# Patient Record
Sex: MALE | Race: BLACK OR AFRICAN AMERICAN
[De-identification: names, ages, dates, MRNs, and addresses within clinical notes are randomized per-mention and may not be internally consistent; named-entity substitution may affect disease eponyms.]

---

## 2024-02-28 ENCOUNTER — NON-APPOINTMENT (OUTPATIENT)
Age: 49
End: 2024-02-28

## 2024-02-28 ENCOUNTER — APPOINTMENT (OUTPATIENT)
Dept: ORTHOPEDIC SURGERY | Facility: CLINIC | Age: 49
End: 2024-02-28
Payer: OTHER MISCELLANEOUS

## 2024-02-28 DIAGNOSIS — S16.1XXA STRAIN OF MUSCLE, FASCIA AND TENDON AT NECK LEVEL, INITIAL ENCOUNTER: ICD-10-CM

## 2024-02-28 PROBLEM — Z00.00 ENCOUNTER FOR PREVENTIVE HEALTH EXAMINATION: Status: ACTIVE | Noted: 2024-02-28

## 2024-02-28 PROCEDURE — 99204 OFFICE O/P NEW MOD 45 MIN: CPT

## 2024-02-28 RX ORDER — DICLOFENAC SODIUM 75 MG/1
75 TABLET, DELAYED RELEASE ORAL TWICE DAILY
Qty: 60 | Refills: 0 | Status: COMPLETED | COMMUNITY
Start: 2024-02-28 | End: 2024-03-29

## 2024-02-28 NOTE — IMAGING
[Right] : right shoulder [Outside films reviewed] : Outside films reviewed [There are no fractures, subluxations or dislocations. No significant abnormalities are seen] : There are no fractures, subluxations or dislocations. No significant abnormalities are seen [FreeTextEntry1] : no acute fx

## 2024-02-28 NOTE — WORK
[Sprain/Strain] : sprain/strain [Are consistent with the injury] : are consistent with the injury [Was the competent medical cause of the injury] : was the competent medical cause of the injury [Consistent with my objective findings] : consistent with my objective findings [Mild Partial] : mild partial [Does not reveal pre-existing condition(s) that may affect treatment/prognosis] : does not reveal pre-existing condition(s) that may affect treatment/prognosis [Can return to work with limitations on ______] : can return to work with limitations on [unfilled] [Lifting] : lifting [Reaching at/below shoulder level] : reaching at or below shoulder level [Pulling/Pushing] : pulling/pushing [Reaching overhead] : reaching overhead [Use of upper extremities] : use of upper extremities [Unknown at this time] : : unknown at this time [I provided the services listed above] :  I provided the services listed above. [Patient] : patient [No Rx restrictions] : No Rx restrictions.

## 2024-02-28 NOTE — ASSESSMENT
[FreeTextEntry1] : R shoulder with impingement/ bursitis.  Also right trap/ neck pain with spasms MRI right shoulder to assess to eval cuff. Start pt for neck and right shoulder.  Diclofenac prn.  Okay to continue to rtw light duty, 20 lbs WB restriction.  RTO with results.    ***I do reocmmend neck be included on the case, as I think there is an injury to the neck

## 2024-02-28 NOTE — HISTORY OF PRESENT ILLNESS
[Right Arm] : right arm [Work related] : work related [Sudden] : sudden [Dull/Aching] : dull/aching [Sharp] : sharp [Frequent] : frequent [Stabbing] : stabbing [Leisure] : leisure [Exercising] : exercising [Rest] : rest [Full time] : Work status: full time [de-identified] : WC DOI 1/23/24 ([facility] )  2/28/24:  This is 49 yo RHD male who presents with acute onset of right shoulder pain after a fall on the truck at work on date above.  When he fell he braced himself on the floor with his forearm.  No prior right shoulder issues.  Pain continues to persist and worse heavy lifting and reaching.  He also gets occ pain into the neck as well.  Some tingling into right arm at times.  [facility]  He then went to PCP who recommended PT which he did not do.   Currently working.    [] : Post Surgical Visit: no [FreeTextEntry1] : shoulder [FreeTextEntry3] : 1/23/24 [FreeTextEntry5] : pt fell while on the truck and caused pain in the right shoulder.  [de-identified] : PCP [de-identified] : xrays

## 2024-03-02 ENCOUNTER — APPOINTMENT (OUTPATIENT)
Dept: MRI IMAGING | Facility: CLINIC | Age: 49
End: 2024-03-02
Payer: OTHER MISCELLANEOUS

## 2024-03-02 PROCEDURE — 73221 MRI JOINT UPR EXTREM W/O DYE: CPT | Mod: RT

## 2024-03-13 ENCOUNTER — APPOINTMENT (OUTPATIENT)
Dept: ORTHOPEDIC SURGERY | Facility: CLINIC | Age: 49
End: 2024-03-13
Payer: OTHER MISCELLANEOUS

## 2024-03-13 ENCOUNTER — NON-APPOINTMENT (OUTPATIENT)
Age: 49
End: 2024-03-13

## 2024-03-13 VITALS — HEIGHT: 68 IN | WEIGHT: 210 LBS | BODY MASS INDEX: 31.83 KG/M2

## 2024-03-13 PROCEDURE — 99214 OFFICE O/P EST MOD 30 MIN: CPT

## 2024-03-13 NOTE — WORK
[Sprain/Strain] : sprain/strain [Was the competent medical cause of the injury] : was the competent medical cause of the injury [Are consistent with the injury] : are consistent with the injury [Consistent with my objective findings] : consistent with my objective findings [Mild Partial] : mild partial [Does not reveal pre-existing condition(s) that may affect treatment/prognosis] : does not reveal pre-existing condition(s) that may affect treatment/prognosis [Can return to work with limitations on ______] : can return to work with limitations on [unfilled] [Lifting] : lifting [Pulling/Pushing] : pulling/pushing [Reaching overhead] : reaching overhead [Reaching at/below shoulder level] : reaching at or below shoulder level [Use of upper extremities] : use of upper extremities [Unknown at this time] : : unknown at this time [Patient] : patient [No Rx restrictions] : No Rx restrictions. [I provided the services listed above] :  I provided the services listed above.

## 2024-03-13 NOTE — ASSESSMENT
[FreeTextEntry1] : R shoulder with impingement/ bursitis.  Also right trap/ neck pain with spasms MRI R shoulder: PRCT, bursitis, signs of shoulder subluxation with labral tearing with Henriquez Sachs.  Start PT for neck and right shoulder.  Diclofenac prn.  RTW light duty, 20 lbs WB restriction.  RTO with results.    ***I do recommend neck be included on the case, as I think there is an injury to the neck

## 2024-03-13 NOTE — DATA REVIEWED
[MRI] : MRI [Right] : of the right [Shoulder] : shoulder [I independently reviewed and interpreted images and report] : I independently reviewed and interpreted images and report [FreeTextEntry1] : PRCT, bursitis, signs of shoulder subluxation with labral tearing with Henriquez Sachs.

## 2024-03-13 NOTE — HISTORY OF PRESENT ILLNESS
[6] : 6 [4] : 4 [Dull/Aching] : dull/aching [Light duty] : Work status: light duty [de-identified] : WC DOI 1/23/24 ([facility] )  3/13/24: Here for MRI review. He continues to feel some pain in the shoulder and paresthesias in the digits.   MRI R shoulder: PRCT, bursitis, signs of shoulder subluxation with labral tearing with Henriquez Sachs.   2/28/24:  This is 49 yo RHD male who presents with acute onset of right shoulder pain after a fall on the truck at work on date above.  When he fell he braced himself on the floor with his forearm.  No prior right shoulder issues.  Pain continues to persist and worse heavy lifting and reaching.  He also gets occ pain into the neck as well.  Some tingling into right arm at times.  [facility].  He then went to PCP who recommended PT which he did not do.   Currently working.    [] : Post Surgical Visit: no [de-identified] : none

## 2024-04-24 ENCOUNTER — NON-APPOINTMENT (OUTPATIENT)
Age: 49
End: 2024-04-24

## 2024-04-24 ENCOUNTER — APPOINTMENT (OUTPATIENT)
Dept: ORTHOPEDIC SURGERY | Facility: CLINIC | Age: 49
End: 2024-04-24
Payer: OTHER MISCELLANEOUS

## 2024-04-24 VITALS — WEIGHT: 210 LBS | HEIGHT: 68 IN | BODY MASS INDEX: 31.83 KG/M2

## 2024-04-24 PROCEDURE — 99213 OFFICE O/P EST LOW 20 MIN: CPT

## 2024-04-24 NOTE — ASSESSMENT
[FreeTextEntry1] : R shoulder with impingement/ bursitis.  Also right trap/ neck pain with spasms MRI R shoulder: PRCT, bursitis, signs of shoulder subluxation with labral tearing with Henriquez Sachs.  Continue PT for neck and right shoulder as he is making gains.  Working light duty, 20 lbs WB restriction.  RTO 6 weeks   ***I do recommend neck be included on the case, as I think there is an injury to the neck

## 2024-04-24 NOTE — HISTORY OF PRESENT ILLNESS
[Work related] : work related [5] : 5 [4] : 4 [Dull/Aching] : dull/aching [Sharp] : sharp [Full time] : Work status: full time [de-identified] : WC DOI 1/23/24 ([facility] )  4/24/24: Here for follow-up on the right shoulder. He has been attending PT with improvement.  Some tingling persists into the arm. He is working with restrictions.   3/13/24: Here for MRI review. He continues to feel some pain in the shoulder and paresthesias in the digits.   MRI R shoulder: PRCT, bursitis, signs of shoulder subluxation with labral tearing with Henriquez Sachs.   2/28/24:  This is 47 yo RHD male who presents with acute onset of right shoulder pain after a fall on the truck at work on date above.  When he fell he braced himself on the floor with his forearm.  No prior right shoulder issues.  Pain continues to persist and worse heavy lifting and reaching.  He also gets occ pain into the neck as well.  Some tingling into right arm at times.  [facility].  He then went to PCP who recommended PT which he did not do.   Currently working.    [] : Post Surgical Visit: no [FreeTextEntry1] : Right shoulder  [FreeTextEntry3] : 1/23/2024 [de-identified] : pt

## 2024-04-24 NOTE — PHYSICAL EXAM
[Right] : right shoulder [] : no erythema [4 ___] : forward flexion 4[unfilled]/5 [4___] : external rotation 4[unfilled]/5 [FreeTextEntry9] : FF - 165 ER - 60

## 2024-06-05 ENCOUNTER — NON-APPOINTMENT (OUTPATIENT)
Age: 49
End: 2024-06-05

## 2024-06-05 ENCOUNTER — APPOINTMENT (OUTPATIENT)
Dept: ORTHOPEDIC SURGERY | Facility: CLINIC | Age: 49
End: 2024-06-05
Payer: OTHER MISCELLANEOUS

## 2024-06-05 VITALS — WEIGHT: 210 LBS | BODY MASS INDEX: 31.83 KG/M2 | HEIGHT: 68 IN

## 2024-06-05 DIAGNOSIS — M75.111 INCOMPLETE ROTATOR CUFF TEAR OR RUPTURE OF RIGHT SHOULDER, NOT SPECIFIED AS TRAUMATIC: ICD-10-CM

## 2024-06-05 DIAGNOSIS — M75.51 BURSITIS OF RIGHT SHOULDER: ICD-10-CM

## 2024-06-05 DIAGNOSIS — M75.41 IMPINGEMENT SYNDROME OF RIGHT SHOULDER: ICD-10-CM

## 2024-06-05 DIAGNOSIS — S43.001D UNSPECIFIED SUBLUXATION OF RIGHT SHOULDER JOINT, SUBSEQUENT ENCOUNTER: ICD-10-CM

## 2024-06-05 DIAGNOSIS — S43.431D SUPERIOR GLENOID LABRUM LESION OF RIGHT SHOULDER, SUBSEQUENT ENCOUNTER: ICD-10-CM

## 2024-06-05 PROCEDURE — 99213 OFFICE O/P EST LOW 20 MIN: CPT

## 2024-06-05 NOTE — HISTORY OF PRESENT ILLNESS
[Part time] : Work status: part time [de-identified] : WC DOI 1/23/24 ([facility] )  6/5/24:   Here for follow up.  He is in PT with improvement.  He feels motion has improved, but still has some weakness and pain.  He continues to have pain in the neck and is starting to have pain in the left shoulder as well, due to compensation.   4/24/24: Here for follow-up on the right shoulder. He has been attending PT with improvement.  Some tingling persists into the arm. He is working with restrictions.   3/13/24: Here for MRI review. He continues to feel some pain in the shoulder and paresthesias in the digits.   MRI R shoulder: PRCT, bursitis, signs of shoulder subluxation with labral tearing with Martinez Jameson.   2/28/24:  This is 47 yo RHD male who presents with acute onset of right shoulder pain after a fall on the truck at work on date above.  When he fell he braced himself on the floor with his forearm.  No prior right shoulder issues.  Pain continues to persist and worse heavy lifting and reaching.  He also gets occ pain into the neck as well.  Some tingling into right arm at times.  [facility].  He then went to PCP who recommended PT which he did not do.   Currently working.    [] : Post Surgical Visit: no

## 2024-06-05 NOTE — ASSESSMENT
[FreeTextEntry1] : R shoulder with impingement/ bursitis.  Also right trap/ neck pain with spasms MRI R shoulder: PRCT, bursitis, signs of shoulder subluxation with labral tearing with Henriquez Sachs.  Continue PT for neck and right shoulder as he is making gains.  Working light duty, 20 lbs WB restriction.  RTO 6-8 weeks   ***I do recommend neck be included on the case, as I think there is an injury to the neck

## 2024-06-05 NOTE — PHYSICAL EXAM
[Right] : right shoulder [5 ___] : forward flexion 5[unfilled]/5 [5___] : external rotation 5[unfilled]/5 [] : discomfort with strength testing [FreeTextEntry9] : FF - 160 ER - 60

## 2024-07-31 ENCOUNTER — APPOINTMENT (OUTPATIENT)
Dept: ORTHOPEDIC SURGERY | Facility: CLINIC | Age: 49
End: 2024-07-31
Payer: OTHER MISCELLANEOUS

## 2024-07-31 VITALS — BODY MASS INDEX: 31.83 KG/M2 | WEIGHT: 210 LBS | HEIGHT: 68 IN

## 2024-07-31 DIAGNOSIS — M75.41 IMPINGEMENT SYNDROME OF RIGHT SHOULDER: ICD-10-CM

## 2024-07-31 DIAGNOSIS — M75.111 INCOMPLETE ROTATOR CUFF TEAR OR RUPTURE OF RIGHT SHOULDER, NOT SPECIFIED AS TRAUMATIC: ICD-10-CM

## 2024-07-31 DIAGNOSIS — M75.51 BURSITIS OF RIGHT SHOULDER: ICD-10-CM

## 2024-07-31 DIAGNOSIS — S43.431D SUPERIOR GLENOID LABRUM LESION OF RIGHT SHOULDER, SUBSEQUENT ENCOUNTER: ICD-10-CM

## 2024-07-31 PROCEDURE — 99213 OFFICE O/P EST LOW 20 MIN: CPT

## 2024-07-31 RX ORDER — DICLOFENAC SODIUM 75 MG/1
75 TABLET, DELAYED RELEASE ORAL TWICE DAILY
Qty: 60 | Refills: 2 | Status: ACTIVE | COMMUNITY
Start: 2024-07-31 | End: 2024-10-29

## 2024-07-31 NOTE — HISTORY OF PRESENT ILLNESS
[de-identified] : WC DOI 1/23/24 ([facility] )  6/5/24:   Here for follow up.  He is in PT with improvement.  He feels motion has improved, but still has some weakness and pain.  He continues to have pain in the neck and is starting to have pain in the left shoulder as well, due to compensation.   4/24/24: Here for follow-up on the right shoulder. He has been attending PT with improvement.  Some tingling persists into the arm. He is working with restrictions.   3/13/24: Here for MRI review. He continues to feel some pain in the shoulder and paresthesias in the digits.   MRI R shoulder: PRCT, bursitis, signs of shoulder subluxation with labral tearing with Martinez Jameson.   2/28/24:  This is 47 yo RHD male who presents with acute onset of right shoulder pain after a fall on the truck at work on date above.  When he fell he braced himself on the floor with his forearm.  No prior right shoulder issues.  Pain continues to persist and worse heavy lifting and reaching.  He also gets occ pain into the neck as well.  Some tingling into right arm at times.  [facility].  He then went to PCP who recommended PT which he did not do.   Currently working.

## 2024-07-31 NOTE — HISTORY OF PRESENT ILLNESS
[de-identified] : WC DOI 1/23/24 ([facility] )  6/5/24:   Here for follow up.  He is in PT with improvement.  He feels motion has improved, but still has some weakness and pain.  He continues to have pain in the neck and is starting to have pain in the left shoulder as well, due to compensation.   4/24/24: Here for follow-up on the right shoulder. He has been attending PT with improvement.  Some tingling persists into the arm. He is working with restrictions.   3/13/24: Here for MRI review. He continues to feel some pain in the shoulder and paresthesias in the digits.   MRI R shoulder: PRCT, bursitis, signs of shoulder subluxation with labral tearing with Martinez Jameson.   2/28/24:  This is 49 yo RHD male who presents with acute onset of right shoulder pain after a fall on the truck at work on date above.  When he fell he braced himself on the floor with his forearm.  No prior right shoulder issues.  Pain continues to persist and worse heavy lifting and reaching.  He also gets occ pain into the neck as well.  Some tingling into right arm at times.  [facility].  He then went to PCP who recommended PT which he did not do.   Currently working.

## 2024-07-31 NOTE — PHYSICAL EXAM
[Right] : right shoulder [5 ___] : forward flexion 5[unfilled]/5 [5___] : external rotation 5[unfilled]/5 [] : no erythema [FreeTextEntry9] : FF - 160 ER - 60

## 2024-07-31 NOTE — ASSESSMENT
[FreeTextEntry1] : R shoulder with impingement/ bursitis.  Also right trap/ neck pain with spasms MRI R shoulder: PRCT, bursitis, signs of shoulder subluxation with labral tearing with Henriquez Sachs.  Continue PT for neck and right shoulder as he is making gains.  Diclofenac 75mg BID prn pain.   Working light duty, 20 lbs WB restriction.  RTO 6-8 weeks   ***I do recommend neck be included on the case, as I think there is an injury to the neck

## 2024-09-18 ENCOUNTER — NON-APPOINTMENT (OUTPATIENT)
Age: 49
End: 2024-09-18

## 2024-09-18 ENCOUNTER — APPOINTMENT (OUTPATIENT)
Dept: ORTHOPEDIC SURGERY | Facility: CLINIC | Age: 49
End: 2024-09-18
Payer: OTHER MISCELLANEOUS

## 2024-09-18 DIAGNOSIS — S16.1XXA STRAIN OF MUSCLE, FASCIA AND TENDON AT NECK LEVEL, INITIAL ENCOUNTER: ICD-10-CM

## 2024-09-18 DIAGNOSIS — M75.111 INCOMPLETE ROTATOR CUFF TEAR OR RUPTURE OF RIGHT SHOULDER, NOT SPECIFIED AS TRAUMATIC: ICD-10-CM

## 2024-09-18 DIAGNOSIS — S43.431D SUPERIOR GLENOID LABRUM LESION OF RIGHT SHOULDER, SUBSEQUENT ENCOUNTER: ICD-10-CM

## 2024-09-18 DIAGNOSIS — M75.51 BURSITIS OF RIGHT SHOULDER: ICD-10-CM

## 2024-09-18 DIAGNOSIS — M75.41 IMPINGEMENT SYNDROME OF RIGHT SHOULDER: ICD-10-CM

## 2024-09-18 PROCEDURE — 99214 OFFICE O/P EST MOD 30 MIN: CPT

## 2024-09-18 RX ORDER — DICLOFENAC SODIUM 75 MG/1
75 TABLET, DELAYED RELEASE ORAL TWICE DAILY
Qty: 60 | Refills: 2 | Status: ACTIVE | COMMUNITY
Start: 2024-09-18 | End: 2024-12-17

## 2024-10-30 ENCOUNTER — APPOINTMENT (OUTPATIENT)
Dept: ORTHOPEDIC SURGERY | Facility: CLINIC | Age: 49
End: 2024-10-30
Payer: OTHER MISCELLANEOUS

## 2024-10-30 ENCOUNTER — NON-APPOINTMENT (OUTPATIENT)
Age: 49
End: 2024-10-30

## 2024-10-30 VITALS — HEIGHT: 68 IN | WEIGHT: 210 LBS | BODY MASS INDEX: 31.83 KG/M2

## 2024-10-30 DIAGNOSIS — S16.1XXA STRAIN OF MUSCLE, FASCIA AND TENDON AT NECK LEVEL, INITIAL ENCOUNTER: ICD-10-CM

## 2024-10-30 DIAGNOSIS — S43.431D SUPERIOR GLENOID LABRUM LESION OF RIGHT SHOULDER, SUBSEQUENT ENCOUNTER: ICD-10-CM

## 2024-10-30 DIAGNOSIS — M75.51 BURSITIS OF RIGHT SHOULDER: ICD-10-CM

## 2024-10-30 DIAGNOSIS — M75.41 IMPINGEMENT SYNDROME OF RIGHT SHOULDER: ICD-10-CM

## 2024-10-30 DIAGNOSIS — M75.111 INCOMPLETE ROTATOR CUFF TEAR OR RUPTURE OF RIGHT SHOULDER, NOT SPECIFIED AS TRAUMATIC: ICD-10-CM

## 2024-10-30 PROCEDURE — 99214 OFFICE O/P EST MOD 30 MIN: CPT

## 2025-01-03 ENCOUNTER — APPOINTMENT (OUTPATIENT)
Dept: ORTHOPEDIC SURGERY | Facility: CLINIC | Age: 50
End: 2025-01-03
Payer: OTHER MISCELLANEOUS

## 2025-01-03 DIAGNOSIS — M75.41 IMPINGEMENT SYNDROME OF RIGHT SHOULDER: ICD-10-CM

## 2025-01-03 DIAGNOSIS — M75.111 INCOMPLETE ROTATOR CUFF TEAR OR RUPTURE OF RIGHT SHOULDER, NOT SPECIFIED AS TRAUMATIC: ICD-10-CM

## 2025-01-03 DIAGNOSIS — S43.431D SUPERIOR GLENOID LABRUM LESION OF RIGHT SHOULDER, SUBSEQUENT ENCOUNTER: ICD-10-CM

## 2025-01-03 DIAGNOSIS — M75.51 BURSITIS OF RIGHT SHOULDER: ICD-10-CM

## 2025-01-03 PROCEDURE — 99213 OFFICE O/P EST LOW 20 MIN: CPT

## 2025-01-03 RX ORDER — DICLOFENAC SODIUM 75 MG/1
75 TABLET, DELAYED RELEASE ORAL TWICE DAILY
Qty: 60 | Refills: 2 | Status: ACTIVE | COMMUNITY
Start: 2025-01-03 | End: 2025-04-03

## 2025-04-02 ENCOUNTER — APPOINTMENT (OUTPATIENT)
Dept: ORTHOPEDIC SURGERY | Facility: CLINIC | Age: 50
End: 2025-04-02
Payer: OTHER MISCELLANEOUS

## 2025-04-02 DIAGNOSIS — M75.111 INCOMPLETE ROTATOR CUFF TEAR OR RUPTURE OF RIGHT SHOULDER, NOT SPECIFIED AS TRAUMATIC: ICD-10-CM

## 2025-04-02 DIAGNOSIS — M75.41 IMPINGEMENT SYNDROME OF RIGHT SHOULDER: ICD-10-CM

## 2025-04-02 DIAGNOSIS — S16.1XXA STRAIN OF MUSCLE, FASCIA AND TENDON AT NECK LEVEL, INITIAL ENCOUNTER: ICD-10-CM

## 2025-04-02 DIAGNOSIS — S43.431D SUPERIOR GLENOID LABRUM LESION OF RIGHT SHOULDER, SUBSEQUENT ENCOUNTER: ICD-10-CM

## 2025-04-02 DIAGNOSIS — M75.51 BURSITIS OF RIGHT SHOULDER: ICD-10-CM

## 2025-04-02 PROCEDURE — 99213 OFFICE O/P EST LOW 20 MIN: CPT

## 2025-07-16 ENCOUNTER — APPOINTMENT (OUTPATIENT)
Dept: ORTHOPEDIC SURGERY | Facility: CLINIC | Age: 50
End: 2025-07-16
Payer: OTHER MISCELLANEOUS

## 2025-07-16 ENCOUNTER — NON-APPOINTMENT (OUTPATIENT)
Age: 50
End: 2025-07-16

## 2025-07-16 PROCEDURE — 99213 OFFICE O/P EST LOW 20 MIN: CPT
